# Patient Record
Sex: FEMALE | Race: WHITE | NOT HISPANIC OR LATINO | Employment: FULL TIME | URBAN - METROPOLITAN AREA
[De-identification: names, ages, dates, MRNs, and addresses within clinical notes are randomized per-mention and may not be internally consistent; named-entity substitution may affect disease eponyms.]

---

## 2022-07-25 ENCOUNTER — ANCILLARY PROCEDURE (OUTPATIENT)
Dept: GENERAL RADIOLOGY | Facility: CLINIC | Age: 32
End: 2022-07-25
Attending: OBSTETRICS & GYNECOLOGY
Payer: COMMERCIAL

## 2022-07-25 DIAGNOSIS — N97.9 INFERTILITY, FEMALE: ICD-10-CM

## 2022-07-25 PROCEDURE — 74740 X-RAY FEMALE GENITAL TRACT: CPT

## 2022-07-25 PROCEDURE — 58340 CATHETER FOR HYSTEROGRAPHY: CPT

## 2022-12-06 ENCOUNTER — LAB REQUISITION (OUTPATIENT)
Dept: LAB | Facility: CLINIC | Age: 32
End: 2022-12-06

## 2022-12-06 DIAGNOSIS — Z32.01 ENCOUNTER FOR PREGNANCY TEST, RESULT POSITIVE: ICD-10-CM

## 2022-12-06 PROCEDURE — 84702 CHORIONIC GONADOTROPIN TEST: CPT | Performed by: NURSE PRACTITIONER

## 2022-12-07 LAB — HCG INTACT+B SERPL-ACNC: 380 MIU/ML

## 2022-12-08 ENCOUNTER — LAB REQUISITION (OUTPATIENT)
Dept: LAB | Facility: CLINIC | Age: 32
End: 2022-12-08

## 2022-12-08 DIAGNOSIS — Z32.01 ENCOUNTER FOR PREGNANCY TEST, RESULT POSITIVE: ICD-10-CM

## 2022-12-08 PROCEDURE — 84702 CHORIONIC GONADOTROPIN TEST: CPT | Performed by: NURSE PRACTITIONER

## 2022-12-09 LAB — HCG INTACT+B SERPL-ACNC: 1036 MIU/ML

## 2022-12-15 ENCOUNTER — LAB REQUISITION (OUTPATIENT)
Dept: LAB | Facility: CLINIC | Age: 32
End: 2022-12-15

## 2022-12-15 DIAGNOSIS — O36.80X9 PREGNANCY WITH INCONCLUSIVE FETAL VIABILITY, OTHER FETUS: ICD-10-CM

## 2022-12-15 PROCEDURE — 86901 BLOOD TYPING SEROLOGIC RH(D): CPT | Performed by: NURSE PRACTITIONER

## 2022-12-16 LAB
ABO/RH(D): NORMAL
SPECIMEN EXPIRATION DATE: NORMAL

## 2023-01-16 ENCOUNTER — LAB REQUISITION (OUTPATIENT)
Dept: LAB | Facility: CLINIC | Age: 33
End: 2023-01-16
Payer: COMMERCIAL

## 2023-01-16 ENCOUNTER — TRANSFERRED RECORDS (OUTPATIENT)
Dept: HEALTH INFORMATION MANAGEMENT | Facility: CLINIC | Age: 33
End: 2023-01-16

## 2023-01-16 ENCOUNTER — LAB REQUISITION (OUTPATIENT)
Dept: LAB | Facility: CLINIC | Age: 33
End: 2023-01-16

## 2023-01-16 DIAGNOSIS — Z12.4 ENCOUNTER FOR SCREENING FOR MALIGNANT NEOPLASM OF CERVIX: ICD-10-CM

## 2023-01-16 DIAGNOSIS — Z36.9 ENCOUNTER FOR ANTENATAL SCREENING, UNSPECIFIED: ICD-10-CM

## 2023-01-16 LAB
ABO/RH(D): NORMAL
ANTIBODY SCREEN: NEGATIVE
BASOPHILS # BLD AUTO: 0 10E3/UL (ref 0–0.2)
BASOPHILS NFR BLD AUTO: 0 %
EOSINOPHIL # BLD AUTO: 0.2 10E3/UL (ref 0–0.7)
EOSINOPHIL NFR BLD AUTO: 1 %
ERYTHROCYTE [DISTWIDTH] IN BLOOD BY AUTOMATED COUNT: 14 % (ref 10–15)
HCT VFR BLD AUTO: 47.2 % (ref 35–47)
HGB BLD-MCNC: 15.5 G/DL (ref 11.7–15.7)
HOLD SPECIMEN: NORMAL
IMM GRANULOCYTES # BLD: 0.1 10E3/UL
IMM GRANULOCYTES NFR BLD: 0 %
LYMPHOCYTES # BLD AUTO: 2.7 10E3/UL (ref 0.8–5.3)
LYMPHOCYTES NFR BLD AUTO: 23 %
MCH RBC QN AUTO: 29.9 PG (ref 26.5–33)
MCHC RBC AUTO-ENTMCNC: 32.8 G/DL (ref 31.5–36.5)
MCV RBC AUTO: 91 FL (ref 78–100)
MONOCYTES # BLD AUTO: 0.7 10E3/UL (ref 0–1.3)
MONOCYTES NFR BLD AUTO: 5 %
NEUTROPHILS # BLD AUTO: 8.5 10E3/UL (ref 1.6–8.3)
NEUTROPHILS NFR BLD AUTO: 71 %
NRBC # BLD AUTO: 0 10E3/UL
NRBC BLD AUTO-RTO: 0 /100
PLATELET # BLD AUTO: 241 10E3/UL (ref 150–450)
RBC # BLD AUTO: 5.19 10E6/UL (ref 3.8–5.2)
SPECIMEN EXPIRATION DATE: NORMAL
WBC # BLD AUTO: 12.1 10E3/UL (ref 4–11)

## 2023-01-16 PROCEDURE — G0145 SCR C/V CYTO,THINLAYER,RESCR: HCPCS | Mod: ORL | Performed by: NURSE PRACTITIONER

## 2023-01-16 PROCEDURE — 86592 SYPHILIS TEST NON-TREP QUAL: CPT | Performed by: NURSE PRACTITIONER

## 2023-01-16 PROCEDURE — 86901 BLOOD TYPING SEROLOGIC RH(D): CPT | Performed by: NURSE PRACTITIONER

## 2023-01-16 PROCEDURE — 87491 CHLMYD TRACH DNA AMP PROBE: CPT | Mod: ORL | Performed by: NURSE PRACTITIONER

## 2023-01-16 PROCEDURE — 85025 COMPLETE CBC W/AUTO DIFF WBC: CPT | Mod: ORL | Performed by: NURSE PRACTITIONER

## 2023-01-16 PROCEDURE — 87389 HIV-1 AG W/HIV-1&-2 AB AG IA: CPT | Performed by: NURSE PRACTITIONER

## 2023-01-16 PROCEDURE — 87340 HEPATITIS B SURFACE AG IA: CPT | Performed by: NURSE PRACTITIONER

## 2023-01-16 PROCEDURE — 87624 HPV HI-RISK TYP POOLED RSLT: CPT | Mod: ORL | Performed by: NURSE PRACTITIONER

## 2023-01-16 PROCEDURE — 86762 RUBELLA ANTIBODY: CPT | Performed by: NURSE PRACTITIONER

## 2023-01-16 PROCEDURE — 86803 HEPATITIS C AB TEST: CPT | Performed by: NURSE PRACTITIONER

## 2023-01-16 PROCEDURE — 87086 URINE CULTURE/COLONY COUNT: CPT | Mod: ORL | Performed by: NURSE PRACTITIONER

## 2023-01-17 LAB
C TRACH DNA SPEC QL PROBE+SIG AMP: NEGATIVE
HBV SURFACE AG SERPL QL IA: NONREACTIVE
HCV AB SERPL QL IA: NONREACTIVE
HIV 1+2 AB+HIV1 P24 AG SERPL QL IA: NONREACTIVE
N GONORRHOEA DNA SPEC QL NAA+PROBE: NEGATIVE
RPR SER QL: NONREACTIVE
RUBV IGG SERPL QL IA: 2.11 INDEX
RUBV IGG SERPL QL IA: POSITIVE

## 2023-01-18 LAB — BACTERIA UR CULT: NORMAL

## 2023-01-19 LAB
BKR LAB AP GYN ADEQUACY: NORMAL
BKR LAB AP GYN INTERPRETATION: NORMAL
BKR LAB AP HPV REFLEX: NORMAL
BKR LAB AP LMP: NORMAL
BKR LAB AP PREVIOUS ABNL DX: NORMAL
BKR LAB AP PREVIOUS ABNORMAL: NORMAL
PATH REPORT.COMMENTS IMP SPEC: NORMAL
PATH REPORT.COMMENTS IMP SPEC: NORMAL
PATH REPORT.RELEVANT HX SPEC: NORMAL

## 2023-01-23 LAB
HUMAN PAPILLOMA VIRUS 16 DNA: NEGATIVE
HUMAN PAPILLOMA VIRUS 18 DNA: NEGATIVE
HUMAN PAPILLOMA VIRUS FINAL DIAGNOSIS: NORMAL
HUMAN PAPILLOMA VIRUS OTHER HR: NEGATIVE

## 2023-02-17 ENCOUNTER — TRANSCRIBE ORDERS (OUTPATIENT)
Dept: MATERNAL FETAL MEDICINE | Facility: CLINIC | Age: 33
End: 2023-02-17
Payer: COMMERCIAL

## 2023-02-17 ENCOUNTER — MEDICAL CORRESPONDENCE (OUTPATIENT)
Dept: HEALTH INFORMATION MANAGEMENT | Facility: CLINIC | Age: 33
End: 2023-02-17
Payer: COMMERCIAL

## 2023-02-17 DIAGNOSIS — O26.90 PREGNANCY RELATED CONDITION, ANTEPARTUM: Primary | ICD-10-CM

## 2023-02-20 ENCOUNTER — LAB REQUISITION (OUTPATIENT)
Dept: LAB | Facility: CLINIC | Age: 33
End: 2023-02-20
Payer: COMMERCIAL

## 2023-02-20 DIAGNOSIS — Z3A.16 16 WEEKS GESTATION OF PREGNANCY: ICD-10-CM

## 2023-02-20 PROCEDURE — 82105 ALPHA-FETOPROTEIN SERUM: CPT | Mod: ORL | Performed by: OBSTETRICS & GYNECOLOGY

## 2023-02-22 LAB
# FETUSES US: NORMAL
AFP MOM SERPL: 0.93
AFP SERPL-MCNC: 24 NG/ML
AGE - REPORTED: 33.9 YR
CURRENT SMOKER: NO
FAMILY MEMBER DISEASES HX: NO
GA METHOD: NORMAL
GA: NORMAL WK
IDDM PATIENT QL: NO
INTEGRATED SCN PATIENT-IMP: NORMAL
SPECIMEN DRAWN SERPL: NORMAL

## 2023-02-28 ENCOUNTER — OFFICE VISIT (OUTPATIENT)
Dept: MATERNAL FETAL MEDICINE | Facility: CLINIC | Age: 33
End: 2023-02-28
Attending: NURSE PRACTITIONER
Payer: COMMERCIAL

## 2023-02-28 DIAGNOSIS — Z31.430 ENCOUNTER OF FEMALE FOR TESTING FOR GENETIC DISEASE CARRIER STATUS FOR PROCREATIVE MANAGEMENT: Primary | ICD-10-CM

## 2023-02-28 DIAGNOSIS — O26.90 PREGNANCY RELATED CONDITION, ANTEPARTUM: ICD-10-CM

## 2023-02-28 NOTE — PROGRESS NOTES
"[facility]  Genetic Counseling Consult    Patient:  Janina Mcarthur YOB: 1990   Date of Service:  23   MRN: [id number]    Janina was seen at the [facility] for genetic consultation. The indication for genetic counseling is positive carrier screening for cystic fibrosis. The patient was accompanied to this visit by their partner, Domo. The patient is wearing a mask due to current [facility] policies.     The session was conducted in English.      IMPRESSION/ PLAN   1. Janina has already undergone screening with her primary OB provider. She had a low-risk NIPS and had carrier screening, which was positive for cystic fibrosis. As part of today's consultation, Janina's partner, Domo underwent genetic testing for CFTR mutations through [facility]. Results are expected back in 2-3 weeks and will be called to Domo.     PREGNANCY HISTORY   /Parity: No obstetric history on file.      Janina's pregnancy history is significant for:     This is Janina's first pregnancy.     CURRENT PREGNANCY   Current Age: 32 year old   Age at Delivery: 32 year old  JOSE ALEJANDRO: Not found.                                   Gestational Age: Unknown  This pregnancy is a single gestation.   This pregnancy was conceived spontaneously.    MEDICAL HISTORY   Janina s reported medical history is not expected to impact pregnancy management or risks to fetal development.       FAMILY HISTORY   A three-generation pedigree was obtained today and is scanned under the \"Media\" tab in Epic. The family history was reported by Janina and their partner.    The following significant findings were reported today:     Janina has two brothers who are alive and well.      Janina's partner, Domo, is 34 and healthy.     Domo has one sister who is alive and well.     Otherwise, the reported family history is unremarkable for multiple miscarriages, stillbirths, birth defects, intellectual " disabilities, known genetic conditions, and consanguinity.       CARRIER SCREENING   Janina underwent carrier screening through her OB provider for spinal muscular atrophy (SMA) and cystic fibrosis (97 common mutations). She screened negative for SMA, but was identifed to be a carrier of cystic fibrosis.     Cystic fibrosis (CF) is a genetic condition caused by mutations in the CFTR gene. The condition is characterized by thick mucus in the lungs and digestive system. CF is a chronic condition and affected individuals typically experience breathing problems and lung infections that worsen over time. Affected individuals often also have pancreatic insufficiency. In XY individuals infertility and delayed puberty is common. Severity of symptoms vary for individuals with most cases being diagnosed during childhood or through [place] Screening programs. CF is treated based on symptoms and aims to help with respiratory functions as well as enzymes used to help pancreatic function to proper digestion. With the Catholic of CFTR modulators, individuals with cystic fibrosis are living longer than ever. Importantly, these modulators typically targeted a specific variant called [id number]. This is the variant that Janina carries. This condition has a carrier frequency of 1 in 25 in the  population. Thus, the current reproductive risk is 1 in 100 (1%). In the event that Domo screens negative, there will still be a 1 in 2 chance for this or any future pregnancy to be a carrier of the condition, but the reproductive risk will be significantly reduced. We reviewed that there is evidence that carriers of CF are at an increased risk for pancreatitis, though the empiric risk is <1% and there are no specific management guidelines at this time.     Domo has opted to proceed with testing for cystic fibrosis today. We discussed that if he is positive, this pregnancy and any future pregnancy will have a 1 in 4 (25%) chance to be affected.  We reviewed options for diagnostic testing and discussed [place] Screening. We also discussed that Janina's siblings are at a 50% chance to be carriers of CF.     I also reviewed with the couple that there are options for additional carrier screening, and that current guidelines no longer recommend screening for CF and SMA alone. Additionally, current guidelines do not recommend genotyping for CFTR and instead are in line with sequencing and deletion/duplication analysis.     The couple declined further carrier screening, but know the option remains available to them in the future if desired.        RISK ASSESSMENT FOR CHROMOSOME CONDITIONS   We explained that the risk for fetal chromosome abnormalities increases with maternal age. We discussed specific features of common chromosome abnormalities, including Down syndrome, trisomy 13, trisomy 18, and sex chromosome trisomies.      At age 32 at midtrimester, the risk to have a baby with Down syndrome is 1 in 421.     At age 32 at midtrimester, the risk to have a baby with any chromosome abnormality is 1 in 217.    Janina had genetic screening earlier in this pregnancy. Their non-invasive prenatal test was screen negative or low risk for screened conditions     Non-invasive prenatal testing (NIPT) results    Maternal plasma cell-free DNA testing    Screens for fetal trisomy 21, trisomy 13, trisomy 18, and sex chromosome aneuploidy    First trimester ultrasound with nuchal translucency and nasal bone assessment was not performed in this pregnancy, to our knowledge.    Janina had a [id number] test earlier in pregnancy; we reviewed the results today, which are low risk.    The NIPT did include sex chromosome aneuploidies and the result was low risk. The predicted sex is XX, which is typically female.    Given the accuracy of this test, these results greatly decrease the chance for certain fetal chromosome abnormalities    We discussed the limitations of normal NIPT  results    Maternal serum AFP only to screen for open neural tube defects (after 15 weeks) was not performed in this pregnancy, to our knowledge.     GENETIC TESTING OPTIONS   Genetic testing during a pregnancy includes screening and diagnostic procedures.      Screening tests are non-invasive which means no risk to the pregnancy and includes ultrasounds and blood work. The benefits and limitations of screening were reviewed. Screening tests provide a risk assessment (chance) specific to the pregnancy for certain fetal chromosome abnormalities but cannot definitively diagnose or exclude a fetal chromosome abnormality. Follow-up genetic counseling and consideration of diagnostic testing is recommended with any abnormal screening result. Diagnostic testing during a pregnancy is more certain and can test for more conditions. However, the tests do have a risk of miscarriage that requires careful consideration. These tests can detect fetal chromosome abnormalities with greater than 99% certainty. Results can be compromised by maternal cell contamination or mosaicism and are limited by the resolution of current genetic testing technology.     There is no screening or diagnostic test that detects all forms of birth defects or intellectual disability.       We discussed the following diagnostic options:   Amniocentesis    Invasive diagnostic procedure done after 15 weeks gestation    The procedure collects a small sample of amniotic fluid for the purpose of chromosomal testing and/or other genetic testing    Diagnostic result; more than 99% sensitivity for fetal chromosome abnormalities    Testing for AFP in the amniotic fluid can test for open neural tube defects        It was a pleasure to be involved with [facility] [place]. Face-to-face time of the meeting was 30 minutes.    Jana King GC, MS, [facility]  Certified and [place] Licensed Genetic Counselor  [facility]  Maternal Fetal Medicine  Office: [phone]  M:  [phone]   Fax: [phone]  [facility]

## 2023-03-09 ENCOUNTER — TELEPHONE (OUTPATIENT)
Dept: MATERNAL FETAL MEDICINE | Facility: CLINIC | Age: 33
End: 2023-03-09
Payer: COMMERCIAL

## 2023-03-09 NOTE — TELEPHONE ENCOUNTER
March 9, 2023    I left a voicemail for Janina regarding her partner's carrier screening results for CFTR only.     Results were NEGATIVE, he was not identified to be a carrier of the condition. This reduces the couple's reproductive risk for classic cystic fibrosis to 1 in 17,600. I reminded Janina that any of her children or siblings have a 50% chance to be carriers of cystic fibrosis. Other family members may also be carriers.     Jana King MS, [facility]  Licensed Genetic Counselor  [facility]  Maternal Fetal Medicine  [email or web address]  [phone]

## 2023-05-19 ENCOUNTER — LAB REQUISITION (OUTPATIENT)
Dept: LAB | Facility: CLINIC | Age: 33
End: 2023-05-19
Payer: COMMERCIAL

## 2023-05-19 DIAGNOSIS — Z36.89 ENCOUNTER FOR OTHER SPECIFIED ANTENATAL SCREENING: ICD-10-CM

## 2023-05-19 LAB — HGB BLD-MCNC: 13.6 G/DL (ref 11.7–15.7)

## 2023-05-19 PROCEDURE — 85018 HEMOGLOBIN: CPT | Mod: ORL | Performed by: NURSE PRACTITIONER

## 2023-05-19 PROCEDURE — 86592 SYPHILIS TEST NON-TREP QUAL: CPT | Mod: ORL | Performed by: NURSE PRACTITIONER

## 2023-05-20 ENCOUNTER — HEALTH MAINTENANCE LETTER (OUTPATIENT)
Age: 33
End: 2023-05-20

## 2023-05-22 LAB — RPR SER QL: NONREACTIVE

## 2023-07-17 ENCOUNTER — LAB REQUISITION (OUTPATIENT)
Dept: LAB | Facility: CLINIC | Age: 33
End: 2023-07-17
Payer: COMMERCIAL

## 2023-07-17 DIAGNOSIS — Z3A.36 36 WEEKS GESTATION OF PREGNANCY: ICD-10-CM

## 2023-07-17 PROCEDURE — 87653 STREP B DNA AMP PROBE: CPT | Mod: ORL | Performed by: OBSTETRICS & GYNECOLOGY

## 2023-07-18 LAB — GP B STREP DNA SPEC QL NAA+PROBE: NEGATIVE

## 2023-08-15 ENCOUNTER — TRANSFERRED RECORDS (OUTPATIENT)
Dept: OBGYN | Facility: CLINIC | Age: 33
End: 2023-08-15
Payer: COMMERCIAL

## 2023-08-15 ENCOUNTER — HOSPITAL ENCOUNTER (INPATIENT)
Facility: CLINIC | Age: 33
LOS: 3 days | Discharge: HOME | End: 2023-08-18
Attending: OBSTETRICS & GYNECOLOGY | Admitting: OBSTETRICS & GYNECOLOGY
Payer: COMMERCIAL

## 2023-08-15 PROBLEM — Z36.89 ENCOUNTER FOR TRIAGE IN PREGNANT PATIENT: Status: ACTIVE | Noted: 2023-08-15

## 2023-08-15 LAB
ABO/RH(D): NORMAL
ANTIBODY SCREEN: NEGATIVE
HGB BLD-MCNC: 14.3 G/DL (ref 11.7–15.7)
SPECIMEN EXPIRATION DATE: NORMAL

## 2023-08-15 PROCEDURE — 86780 TREPONEMA PALLIDUM: CPT | Performed by: OBSTETRICS & GYNECOLOGY

## 2023-08-15 PROCEDURE — 85018 HEMOGLOBIN: CPT | Performed by: OBSTETRICS & GYNECOLOGY

## 2023-08-15 PROCEDURE — 120N000001 HC R&B MED SURG/OB

## 2023-08-15 PROCEDURE — G0463 HOSPITAL OUTPT CLINIC VISIT: HCPCS

## 2023-08-15 RX ORDER — METOCLOPRAMIDE 10 MG/1
10 TABLET ORAL EVERY 6 HOURS PRN
Status: DISCONTINUED | OUTPATIENT
Start: 2023-08-15 | End: 2023-08-18 | Stop reason: HOSPADM

## 2023-08-15 RX ORDER — PROCHLORPERAZINE MALEATE 10 MG
10 TABLET ORAL EVERY 6 HOURS PRN
Status: DISCONTINUED | OUTPATIENT
Start: 2023-08-15 | End: 2023-08-18 | Stop reason: HOSPADM

## 2023-08-15 RX ORDER — KETOROLAC TROMETHAMINE 30 MG/ML
30 INJECTION, SOLUTION INTRAMUSCULAR; INTRAVENOUS
Status: DISCONTINUED | OUTPATIENT
Start: 2023-08-15 | End: 2023-08-18 | Stop reason: HOSPADM

## 2023-08-15 RX ORDER — NALOXONE HYDROCHLORIDE 0.4 MG/ML
0.2 INJECTION, SOLUTION INTRAMUSCULAR; INTRAVENOUS; SUBCUTANEOUS
Status: DISCONTINUED | OUTPATIENT
Start: 2023-08-15 | End: 2023-08-18 | Stop reason: HOSPADM

## 2023-08-15 RX ORDER — OXYTOCIN/0.9 % SODIUM CHLORIDE 30/500 ML
340 PLASTIC BAG, INJECTION (ML) INTRAVENOUS CONTINUOUS PRN
Status: DISCONTINUED | OUTPATIENT
Start: 2023-08-15 | End: 2023-08-18 | Stop reason: HOSPADM

## 2023-08-15 RX ORDER — METOCLOPRAMIDE HYDROCHLORIDE 5 MG/ML
10 INJECTION INTRAMUSCULAR; INTRAVENOUS EVERY 6 HOURS PRN
Status: DISCONTINUED | OUTPATIENT
Start: 2023-08-15 | End: 2023-08-18 | Stop reason: HOSPADM

## 2023-08-15 RX ORDER — CITRIC ACID/SODIUM CITRATE 334-500MG
30 SOLUTION, ORAL ORAL
Status: DISCONTINUED | OUTPATIENT
Start: 2023-08-15 | End: 2023-08-18 | Stop reason: HOSPADM

## 2023-08-15 RX ORDER — NALOXONE HYDROCHLORIDE 0.4 MG/ML
0.4 INJECTION, SOLUTION INTRAMUSCULAR; INTRAVENOUS; SUBCUTANEOUS
Status: DISCONTINUED | OUTPATIENT
Start: 2023-08-15 | End: 2023-08-18 | Stop reason: HOSPADM

## 2023-08-15 RX ORDER — OXYTOCIN 10 [USP'U]/ML
10 INJECTION, SOLUTION INTRAMUSCULAR; INTRAVENOUS
Status: DISCONTINUED | OUTPATIENT
Start: 2023-08-15 | End: 2023-08-18 | Stop reason: HOSPADM

## 2023-08-15 RX ORDER — MISOPROSTOL 200 UG/1
800 TABLET ORAL
Status: DISCONTINUED | OUTPATIENT
Start: 2023-08-15 | End: 2023-08-17

## 2023-08-15 RX ORDER — METHYLERGONOVINE MALEATE 0.2 MG/ML
200 INJECTION INTRAVENOUS
Status: DISCONTINUED | OUTPATIENT
Start: 2023-08-15 | End: 2023-08-17

## 2023-08-15 RX ORDER — ONDANSETRON 2 MG/ML
4 INJECTION INTRAMUSCULAR; INTRAVENOUS EVERY 6 HOURS PRN
Status: DISCONTINUED | OUTPATIENT
Start: 2023-08-15 | End: 2023-08-18 | Stop reason: HOSPADM

## 2023-08-15 RX ORDER — SODIUM CHLORIDE, SODIUM LACTATE, POTASSIUM CHLORIDE, CALCIUM CHLORIDE 600; 310; 30; 20 MG/100ML; MG/100ML; MG/100ML; MG/100ML
INJECTION, SOLUTION INTRAVENOUS CONTINUOUS
Status: DISCONTINUED | OUTPATIENT
Start: 2023-08-15 | End: 2023-08-18 | Stop reason: HOSPADM

## 2023-08-15 RX ORDER — TRANEXAMIC ACID 10 MG/ML
1 INJECTION, SOLUTION INTRAVENOUS EVERY 30 MIN PRN
Status: DISCONTINUED | OUTPATIENT
Start: 2023-08-15 | End: 2023-08-17

## 2023-08-15 RX ORDER — CARBOPROST TROMETHAMINE 250 UG/ML
250 INJECTION, SOLUTION INTRAMUSCULAR
Status: DISCONTINUED | OUTPATIENT
Start: 2023-08-15 | End: 2023-08-17

## 2023-08-15 RX ORDER — OXYTOCIN/0.9 % SODIUM CHLORIDE 30/500 ML
100-340 PLASTIC BAG, INJECTION (ML) INTRAVENOUS CONTINUOUS PRN
Status: DISCONTINUED | OUTPATIENT
Start: 2023-08-15 | End: 2023-08-18 | Stop reason: HOSPADM

## 2023-08-15 RX ORDER — MISOPROSTOL 200 UG/1
400 TABLET ORAL
Status: DISCONTINUED | OUTPATIENT
Start: 2023-08-15 | End: 2023-08-17

## 2023-08-15 RX ORDER — PROCHLORPERAZINE 25 MG
25 SUPPOSITORY, RECTAL RECTAL EVERY 12 HOURS PRN
Status: DISCONTINUED | OUTPATIENT
Start: 2023-08-15 | End: 2023-08-18 | Stop reason: HOSPADM

## 2023-08-15 RX ORDER — ONDANSETRON 4 MG/1
4 TABLET, ORALLY DISINTEGRATING ORAL EVERY 6 HOURS PRN
Status: DISCONTINUED | OUTPATIENT
Start: 2023-08-15 | End: 2023-08-18 | Stop reason: HOSPADM

## 2023-08-15 RX ORDER — FENTANYL CITRATE 50 UG/ML
100 INJECTION, SOLUTION INTRAMUSCULAR; INTRAVENOUS
Status: DISCONTINUED | OUTPATIENT
Start: 2023-08-15 | End: 2023-08-18 | Stop reason: HOSPADM

## 2023-08-15 RX ORDER — ASPIRIN 81 MG/1
81 TABLET ORAL DAILY
Status: ON HOLD | COMMUNITY
End: 2023-08-18

## 2023-08-15 RX ORDER — IBUPROFEN 800 MG/1
800 TABLET, FILM COATED ORAL
Status: DISCONTINUED | OUTPATIENT
Start: 2023-08-15 | End: 2023-08-18 | Stop reason: HOSPADM

## 2023-08-15 ASSESSMENT — ACTIVITIES OF DAILY LIVING (ADL)
WALKING_OR_CLIMBING_STAIRS_DIFFICULTY: NO
DRESSING/BATHING_DIFFICULTY: NO
TOILETING_ISSUES: NO
WEAR_GLASSES_OR_BLIND: NO
CONCENTRATING,_REMEMBERING_OR_MAKING_DECISIONS_DIFFICULTY: NO
DIFFICULTY_EATING/SWALLOWING: NO
CHANGE_IN_FUNCTIONAL_STATUS_SINCE_ONSET_OF_CURRENT_ILLNESS/INJURY: NO
ADLS_ACUITY_SCORE: 35
DOING_ERRANDS_INDEPENDENTLY_DIFFICULTY: NO
FALL_HISTORY_WITHIN_LAST_SIX_MONTHS: NO

## 2023-08-16 ENCOUNTER — ANESTHESIA EVENT (OUTPATIENT)
Dept: OBGYN | Facility: CLINIC | Age: 33
End: 2023-08-16
Payer: COMMERCIAL

## 2023-08-16 ENCOUNTER — ANESTHESIA (OUTPATIENT)
Dept: OBGYN | Facility: CLINIC | Age: 33
End: 2023-08-16
Payer: COMMERCIAL

## 2023-08-16 LAB — T PALLIDUM AB SER QL: NONREACTIVE

## 2023-08-16 PROCEDURE — 36415 COLL VENOUS BLD VENIPUNCTURE: CPT | Performed by: OBSTETRICS & GYNECOLOGY

## 2023-08-16 PROCEDURE — 120N000001 HC R&B MED SURG/OB

## 2023-08-16 PROCEDURE — 250N000009 HC RX 250: Performed by: OBSTETRICS & GYNECOLOGY

## 2023-08-16 PROCEDURE — 250N000013 HC RX MED GY IP 250 OP 250 PS 637: Performed by: OBSTETRICS & GYNECOLOGY

## 2023-08-16 PROCEDURE — 86850 RBC ANTIBODY SCREEN: CPT | Performed by: OBSTETRICS & GYNECOLOGY

## 2023-08-16 PROCEDURE — 258N000003 HC RX IP 258 OP 636: Performed by: OBSTETRICS & GYNECOLOGY

## 2023-08-16 PROCEDURE — 250N000011 HC RX IP 250 OP 636: Performed by: ANESTHESIOLOGY

## 2023-08-16 PROCEDURE — 370N000003 HC ANESTHESIA WARD SERVICE: Performed by: ANESTHESIOLOGY

## 2023-08-16 PROCEDURE — 250N000011 HC RX IP 250 OP 636: Performed by: OBSTETRICS & GYNECOLOGY

## 2023-08-16 PROCEDURE — 00HU33Z INSERTION OF INFUSION DEVICE INTO SPINAL CANAL, PERCUTANEOUS APPROACH: ICD-10-PCS | Performed by: ANESTHESIOLOGY

## 2023-08-16 PROCEDURE — 250N000009 HC RX 250: Performed by: ANESTHESIOLOGY

## 2023-08-16 PROCEDURE — 258N000003 HC RX IP 258 OP 636: Performed by: ANESTHESIOLOGY

## 2023-08-16 PROCEDURE — 250N000011 HC RX IP 250 OP 636: Mod: JZ | Performed by: ANESTHESIOLOGY

## 2023-08-16 PROCEDURE — 3E0R3BZ INTRODUCTION OF ANESTHETIC AGENT INTO SPINAL CANAL, PERCUTANEOUS APPROACH: ICD-10-PCS | Performed by: ANESTHESIOLOGY

## 2023-08-16 RX ORDER — SODIUM CHLORIDE, SODIUM LACTATE, POTASSIUM CHLORIDE, CALCIUM CHLORIDE 600; 310; 30; 20 MG/100ML; MG/100ML; MG/100ML; MG/100ML
INJECTION, SOLUTION INTRAVENOUS CONTINUOUS PRN
Status: DISCONTINUED | OUTPATIENT
Start: 2023-08-16 | End: 2023-08-18 | Stop reason: HOSPADM

## 2023-08-16 RX ORDER — ONDANSETRON 2 MG/ML
4 INJECTION INTRAMUSCULAR; INTRAVENOUS EVERY 6 HOURS PRN
Status: DISCONTINUED | OUTPATIENT
Start: 2023-08-16 | End: 2023-08-18 | Stop reason: HOSPADM

## 2023-08-16 RX ORDER — CALCIUM CARBONATE 500 MG/1
500 TABLET, CHEWABLE ORAL DAILY PRN
Status: DISCONTINUED | OUTPATIENT
Start: 2023-08-16 | End: 2023-08-18 | Stop reason: HOSPADM

## 2023-08-16 RX ORDER — BUPIVACAINE HYDROCHLORIDE 2.5 MG/ML
INJECTION, SOLUTION EPIDURAL; INFILTRATION; INTRACAUDAL
Status: COMPLETED | OUTPATIENT
Start: 2023-08-16 | End: 2023-08-16

## 2023-08-16 RX ORDER — LIDOCAINE 40 MG/G
CREAM TOPICAL
Status: DISCONTINUED | OUTPATIENT
Start: 2023-08-16 | End: 2023-08-18 | Stop reason: HOSPADM

## 2023-08-16 RX ORDER — FENTANYL CITRATE-0.9 % NACL/PF 10 MCG/ML
100 PLASTIC BAG, INJECTION (ML) INTRAVENOUS EVERY 5 MIN PRN
Status: DISCONTINUED | OUTPATIENT
Start: 2023-08-16 | End: 2023-08-18 | Stop reason: HOSPADM

## 2023-08-16 RX ORDER — OXYTOCIN/0.9 % SODIUM CHLORIDE 30/500 ML
1-24 PLASTIC BAG, INJECTION (ML) INTRAVENOUS CONTINUOUS
Status: DISCONTINUED | OUTPATIENT
Start: 2023-08-16 | End: 2023-08-18 | Stop reason: HOSPADM

## 2023-08-16 RX ORDER — LIDOCAINE HCL/EPINEPHRINE/PF 2%-1:200K
VIAL (ML) INJECTION
Status: COMPLETED | OUTPATIENT
Start: 2023-08-16 | End: 2023-08-16

## 2023-08-16 RX ORDER — ONDANSETRON 4 MG/1
4 TABLET, ORALLY DISINTEGRATING ORAL EVERY 6 HOURS PRN
Status: DISCONTINUED | OUTPATIENT
Start: 2023-08-16 | End: 2023-08-18 | Stop reason: HOSPADM

## 2023-08-16 RX ORDER — NALBUPHINE HYDROCHLORIDE 20 MG/ML
2.5-5 INJECTION, SOLUTION INTRAMUSCULAR; INTRAVENOUS; SUBCUTANEOUS EVERY 6 HOURS PRN
Status: DISCONTINUED | OUTPATIENT
Start: 2023-08-16 | End: 2023-08-18 | Stop reason: HOSPADM

## 2023-08-16 RX ADMIN — BUPIVACAINE HYDROCHLORIDE 10 ML: 2.5 INJECTION, SOLUTION EPIDURAL; INFILTRATION; INTRACAUDAL at 03:20

## 2023-08-16 RX ADMIN — Medication 2 MILLI-UNITS/MIN: at 08:37

## 2023-08-16 RX ADMIN — Medication: at 20:56

## 2023-08-16 RX ADMIN — Medication: at 11:06

## 2023-08-16 RX ADMIN — SODIUM CHLORIDE, POTASSIUM CHLORIDE, SODIUM LACTATE AND CALCIUM CHLORIDE: 600; 310; 30; 20 INJECTION, SOLUTION INTRAVENOUS at 12:54

## 2023-08-16 RX ADMIN — SODIUM CHLORIDE, POTASSIUM CHLORIDE, SODIUM LACTATE AND CALCIUM CHLORIDE 250 ML: 600; 310; 30; 20 INJECTION, SOLUTION INTRAVENOUS at 09:34

## 2023-08-16 RX ADMIN — LIDOCAINE HYDROCHLORIDE,EPINEPHRINE BITARTRATE 2.5 ML: 20; .005 INJECTION, SOLUTION EPIDURAL; INFILTRATION; INTRACAUDAL; PERINEURAL at 03:18

## 2023-08-16 RX ADMIN — FENTANYL CITRATE 100 MCG: 50 INJECTION, SOLUTION INTRAMUSCULAR; INTRAVENOUS at 02:06

## 2023-08-16 RX ADMIN — FENTANYL CITRATE 100 MCG: 50 INJECTION, SOLUTION INTRAMUSCULAR; INTRAVENOUS at 00:57

## 2023-08-16 RX ADMIN — CALCIUM CARBONATE (ANTACID) CHEW TAB 500 MG 500 MG: 500 CHEW TAB at 17:40

## 2023-08-16 RX ADMIN — SODIUM CHLORIDE, POTASSIUM CHLORIDE, SODIUM LACTATE AND CALCIUM CHLORIDE 500 ML: 600; 310; 30; 20 INJECTION, SOLUTION INTRAVENOUS at 03:04

## 2023-08-16 RX ADMIN — SODIUM CHLORIDE, POTASSIUM CHLORIDE, SODIUM LACTATE AND CALCIUM CHLORIDE: 600; 310; 30; 20 INJECTION, SOLUTION INTRAVENOUS at 18:40

## 2023-08-16 RX ADMIN — SODIUM CHLORIDE, POTASSIUM CHLORIDE, SODIUM LACTATE AND CALCIUM CHLORIDE: 600; 310; 30; 20 INJECTION, SOLUTION INTRAVENOUS at 07:19

## 2023-08-16 RX ADMIN — Medication: at 03:37

## 2023-08-16 ASSESSMENT — ACTIVITIES OF DAILY LIVING (ADL)
ADLS_ACUITY_SCORE: 18

## 2023-08-16 NOTE — PROVIDER NOTIFICATION
08/16/23 1513   Provider Notification   Provider Name/Title Dr. Esquivel   Method of Notification Phone   Request Evaluate - Remote     Cervix 7/80-90/-1. Plan per provider: check cervix at 1630 and update provider unless indicated before.

## 2023-08-16 NOTE — PLAN OF CARE
Data: [facility]: 8/15/2023 10:02 PM.  Reason for maternal/fetal assessment is uterine contractions. Patient reports contractions started at 0300, became more consistent at 1830.  Patient is a .  Prenatal record reviewed. Pregnancy has been uncomplicated..  Gestational Age 40w6d. VSS. Fetal movement active. Patient denies leaking of vaginal fluid/rupture of membranes, vaginal bleeding, abdominal pain, pelvic pressure, nausea, vomiting, headache, visual disturbances, epigastric or URQ pain, significant edema. Support person is present.   Action: Verbal consent for EFM. Triage assessment completed. Bill of rights reviewed.  Response: Patient verbalized agreement with plan. Will contact Dr Summer Garcia with update and for further orders.

## 2023-08-16 NOTE — PROVIDER NOTIFICATION
08/16/23 1815   Provider Notification   Provider Name/Title Dr. Candelaria   Method of Notification Phone   Request Evaluate - Remote   Notification Reason SVE;Status Update     1803- SVE 8.5/90/-1. UC's q 1.5-3. Fetal baseline 145, accels and late and variable decelerations noted. Cranberry urine noted in brooks tubing. 430 ml urine output and LR 2000 ml LR intake since 0700. Brooks bulb palpated between cervix and babies head. Dr. Candelaria updated per phone. Plan per provider: Do cath care and deflate brooks bulb and re- inflate, recheck cervix at 2000 and update provider.

## 2023-08-16 NOTE — PLAN OF CARE
1020- Patient repositioned to throne position, nauseated, repositioned to left side. Angle q 2-6 minutes, occasional minimal variability noted. Recurrent variable and late decelerations noted. SVE 6/80/-1. Patient comfortable with epidural. 1045- Dr. Esquivel updated per phone. Viewed fetal and uterine tracing. Plan per provider: OK to continue Pitocin at 10 carlyn units per hour. 1059- Prolonged deceleration down to 70's. Patient repositioned to right side, Pitocin shut off. 1105- recovery to baseline of 140, moderate variability. Dr. Esquivel aware. Plan per provider: leave Pitocin off for now.

## 2023-08-16 NOTE — ANESTHESIA PROCEDURE NOTES
Epidural catheter Procedure Note    Pre-Procedure   Staff -        Anesthesiologist:  Sam Escobar MD       Performed By: anesthesiologist       Referred By: Radha       Location: OB       Pre-Anesthestic Checklist: patient identified, IV checked, risks and benefits discussed, informed consent, monitors and equipment checked, pre-op evaluation, at physician/surgeon's request and post-op pain management  Timeout:       Correct Patient: Yes        Correct Procedure: Yes        Correct Site: Yes        Correct Position: Yes   Procedure Documentation  Procedure: epidural catheter  Medication(s) Administered   0.25% Bupivacaine PF (Epidural) - EPIDURAL   10 mL - 8/16/2023 3:20:00 AM  2% Lidocaine w/ 1:200K Epi (EPIDURAL) - EPIDURAL   2.5 mL - 8/16/2023 3:18:00 AM   Comments:  Patient desires Labor Epidural for labor analgesia. Vaginal delivery anticipated.    Chart reviewed. Patient examined. No changes to pre procedure chart review. Risks including but not limited to bleeding, infection, nerve injury, PDPH, intrathecal injection, high block, incomplete block, one-sided block, back pain, and low blood pressure discussed in detail. Questions answered. Consent signed.    Pause for the Cause completed. NIBP and pulse ox functioning. L&D nurse present.    Procedure: Sitting. Betadine prep x 3. Sterile drape applied.  Lidocaine 1% x 2 cc local infiltration at L 3-4.  17 G. Tu needle ML VIC 1 attempt.  No CSF, paresthesia or blood. 20 g. Epidural catheter inserted w/o resistance 5 cm.  Negative aspiration for CSF and blood. Filter in line.  Test dose Lidocaine 2% w/ 1:200,000 epi x 2.5 cc injected. Negative for neuro change or symptoms of intravascular injection.  Bolus dose: Marcaine 0.25% 5cc x 2 doses (10 cc total).  Infusion orders written.    I or my partner am immediately available. I or my partner will monitor the patient and supervise nursing care at necessary intervals.    Mini      FOR [facility]  "([facility]) ONLY:   Pain Team Contact information: please page the Pain Team Via [facility]. Search \"Pain\". During daytime hours, please page the attending first. At night please page the resident first.      "

## 2023-08-16 NOTE — PROVIDER NOTIFICATION
Dr Garcia notified of SVE, contractions that have spaced to 2-6 minutes apart, and periods of minimal variability with occasional variable decelerations and 1 late deceleration.  Majority of time category 1 tracing.  Plan to have Dr Welch evaluate patient when she arrives at 8am and possibly perform AROM.

## 2023-08-16 NOTE — PROVIDER NOTIFICATION
08/16/23 1243   Provider Notification   Provider Name/Title Dr. Esquivel   Method of Notification Phone   Request Evaluate - Remote     Contractions q 4.5-6 minutes apart. No decelerations noted at this time. Urine in brooks pinkish boris. Has had 300 ml urine out this shift and 1000 ml LR in and little fluid orally. Plan per provider restart Pitocin at 2 carlyn units.

## 2023-08-16 NOTE — PROVIDER NOTIFICATION
Dr Garcia notified of SVE, contractions every 2-5 minutes palpating moderate, patient getting much more uncomfortable, and category 1 tracing with the exception of a period of minimal variability after a dose of IV fentanyl.  Patient is considering epidural shortly but still undecided.  Will plan to continue to observe labor and recheck per nurses discretion.  When patient receives epidural will plan to place brooks catheter.

## 2023-08-16 NOTE — PROVIDER NOTIFICATION
08/16/23 0820   Provider Notification   Provider Name/Title Dr. Esquivel   Method of Notification At Bedside   Request Evaluate in Person   Notification Reason SVE;Membrane Status     SVE- 5-6/80/-1, AROM, clear fluid. Plan per provider: begin Pitocin augmentation. Patient OK with POC.

## 2023-08-16 NOTE — PROVIDER NOTIFICATION
08/16/23 1655   Provider Notification   Provider Name/Title Dr. Esquivel   Method of Notification Phone   Request Evaluate - Remote     SVE 1635- 7.5-8/90/-1. 1650-  to throne position. Pitocin 10 carlyn units infusing, last increased at 1504. Patient comfortable with epidural. Update given, plan per provider: recheck cervix at 1800 and update Dr. Candelaria.

## 2023-08-16 NOTE — H&P
"OB Brief Admit H&P    No significant change in general health status based on examination of the patient, review of Nursing Admission Database and prenatal record.    Pt is a 32 year old  @ 41w0d who presented to L&D with spontaneous labor.    Patient's prenatal course has been complicated by obesity, history of gastric sleeve. She had a normal growth ultrasound in the 3rd trimester.    Prenatal Labs:    Blood type O+  Rubella immune  GCT passed  GBS negative    EFW: 8.5lb    /55   Pulse 77   Temp 98.3  F (36.8  C) (Oral)   Resp 18   Ht 1.651 m (5' 5\")   Wt 103.4 kg (228 lb)   SpO2 96%   BMI 37.94 kg/m    EFM:  120 baseline, moderate variability, +accels, no decels  Lugoff: q3-7 minutes  SVE: 5-6/80/-1  Membranes:  AROM with scant clear fluid    Assessment:  32 year old  @ 41w0d with spontaneous labor.    Plan:  1. Admit to labor and delivery   2. Contractions spacing. Now s/p AROM with scant fluid, will start pitocin per protocol.  3. Fetal well-being: category I FHT, continuous monitoring.   4. Rubella immune, Rh positive, s/p Tdap.  5. Anticipate .      Maria Fernanda Loyola MD  2023  10:23 AM    "

## 2023-08-16 NOTE — ANESTHESIA PREPROCEDURE EVALUATION
Anesthesia Pre-Procedure Evaluation    Patient: Janina Mcarthur   MRN: [id number] : 1990        Procedure :           History reviewed. No pertinent past medical history.   History reviewed. No pertinent surgical history.   Allergies   Allergen Reactions    Minocycline Hives      Social History     Tobacco Use    Smoking status: Never    Smokeless tobacco: Never   Substance Use Topics    Alcohol use: Not on file      Wt Readings from Last 1 Encounters:   08/15/23 103.4 kg (228 lb)        Anesthesia Evaluation            ROS/MED HX  ENT/Pulmonary:       Neurologic:       Cardiovascular:       METS/Exercise Tolerance:     Hematologic:       Musculoskeletal:       GI/Hepatic:     (+) GERD,                   Renal/Genitourinary:       Endo:       Psychiatric/Substance Use:       Infectious Disease:       Malignancy:       Other:            Physical Exam    Airway        Mallampati: II   TM distance: > 3 FB   Neck ROM: full   Mouth opening: > 3 cm    Respiratory Devices and Support         Dental           Cardiovascular   cardiovascular exam normal          Pulmonary   pulmonary exam normal            Other findings: Lab Test        08/15/23     05/19/23     01/16/23                       2317          0936          1623          WBC           --           --          12.1*         HGB          14.3         13.6         15.5          MCV           --           --          91            PLT           --           --          241            No lab results found.      OUTSIDE LABS:  CBC:   Lab Results   Component Value Date    WBC 12.1 (H) 2023    HGB 14.3 08/15/2023    HGB 13.6 2023    HCT 47.2 (H) 2023     2023     BMP: No results found for: NA, POTASSIUM, CHLORIDE, CO2, BUN, CR, GLC  COAGS: No results found for: PTT, INR, FIBR  POC: No results found for: BGM, HCG, HCGS  HEPATIC: No results found for: ALBUMIN, PROTTOTAL, ALT, AST, GGT, ALKPHOS, BILITOTAL, BILIDIRECT, SALEEM  OTHER: No  results found for: PH, LACT, A1C, RAVEN, PHOS, MAG, LIPASE, AMYLASE, TSH, T4, T3, CRP, SED    Anesthesia Plan    ASA Status:  2       Anesthesia Type: Epidural.              Consents    Anesthesia Plan(s) and associated risks, benefits, and realistic alternatives discussed. Questions answered and patient/representative(s) expressed understanding.     - Discussed: Risks, Benefits and Alternatives for the PROCEDURE were discussed     - Discussed with:  Patient            Postoperative Care       PONV prophylaxis: Ondansetron (or other 5HT-3)     Comments:                Sam Escobar MD

## 2023-08-16 NOTE — PLAN OF CARE
Hypotension noted (see flow sheet). Annette urine noted in brooks. Will give  ml bolus per order.

## 2023-08-16 NOTE — PLAN OF CARE
Data: Patient admitted to room 412 at 2202. Patient is a . Prenatal record reviewed.   OB History    Para Term  AB Living   1 0 0 0 0 0   SAB IAB Ectopic Multiple Live Births   0 0 0 0 0      # Outcome Date GA Lbr Raf/2nd Weight Sex Delivery Anes PTL Lv   1 Current            .  Medical History: History reviewed. No pertinent past medical history..  Gestational age 41w0d. Vital signs per doc flowsheet. Fetal movement present. Patient reports No chief complaint on file.   as reason for admission. Support persons  Domo present.  Action: Report from LINETTE Cabral obtained at 2300. Care of patient assumed at 2300. Verbal consent for EFM, external fetal monitors applied. Admission assessment completed. Patient and support persons educated on labor process. Patient instructed to report change in fetal movement, contractions, vaginal leaking of fluid or bleeding, abdominal pain, or any concerns related to the pregnancy to her nurse/physician. Patient oriented to room, call light in reach.   Response: Dr. Garcia informed of status. Plan per provider is admit and observe labor progress. Patient verbalized understanding of education and verbalized agreement with plan. Patient coping with labor via breathing and position changes.

## 2023-08-17 LAB — HGB BLD-MCNC: 11.9 G/DL (ref 11.7–15.7)

## 2023-08-17 PROCEDURE — 722N000001 HC LABOR CARE VAGINAL DELIVERY SINGLE

## 2023-08-17 PROCEDURE — 36415 COLL VENOUS BLD VENIPUNCTURE: CPT | Performed by: OBSTETRICS & GYNECOLOGY

## 2023-08-17 PROCEDURE — 85018 HEMOGLOBIN: CPT | Performed by: OBSTETRICS & GYNECOLOGY

## 2023-08-17 PROCEDURE — 120N000001 HC R&B MED SURG/OB

## 2023-08-17 PROCEDURE — 0UQMXZZ REPAIR VULVA, EXTERNAL APPROACH: ICD-10-PCS | Performed by: OBSTETRICS & GYNECOLOGY

## 2023-08-17 PROCEDURE — 999N000080 HC STATISTIC IP LACTATION SERVICES 16-30 MIN

## 2023-08-17 PROCEDURE — 10907ZC DRAINAGE OF AMNIOTIC FLUID, THERAPEUTIC FROM PRODUCTS OF CONCEPTION, VIA NATURAL OR ARTIFICIAL OPENING: ICD-10-PCS | Performed by: OBSTETRICS & GYNECOLOGY

## 2023-08-17 PROCEDURE — 250N000011 HC RX IP 250 OP 636: Mod: JZ | Performed by: OBSTETRICS & GYNECOLOGY

## 2023-08-17 PROCEDURE — 250N000013 HC RX MED GY IP 250 OP 250 PS 637: Performed by: OBSTETRICS & GYNECOLOGY

## 2023-08-17 PROCEDURE — 0UQGXZZ REPAIR VAGINA, EXTERNAL APPROACH: ICD-10-PCS | Performed by: OBSTETRICS & GYNECOLOGY

## 2023-08-17 RX ORDER — BISACODYL 10 MG
10 SUPPOSITORY, RECTAL RECTAL DAILY PRN
Status: DISCONTINUED | OUTPATIENT
Start: 2023-08-17 | End: 2023-08-18 | Stop reason: HOSPADM

## 2023-08-17 RX ORDER — DOCUSATE SODIUM 100 MG/1
100 CAPSULE, LIQUID FILLED ORAL DAILY
Status: DISCONTINUED | OUTPATIENT
Start: 2023-08-17 | End: 2023-08-18 | Stop reason: HOSPADM

## 2023-08-17 RX ORDER — MISOPROSTOL 200 UG/1
400 TABLET ORAL
Status: DISCONTINUED | OUTPATIENT
Start: 2023-08-17 | End: 2023-08-18 | Stop reason: HOSPADM

## 2023-08-17 RX ORDER — METHYLERGONOVINE MALEATE 0.2 MG/ML
200 INJECTION INTRAVENOUS
Status: DISCONTINUED | OUTPATIENT
Start: 2023-08-17 | End: 2023-08-18 | Stop reason: HOSPADM

## 2023-08-17 RX ORDER — ACETAMINOPHEN 325 MG/1
650 TABLET ORAL EVERY 4 HOURS PRN
Status: DISCONTINUED | OUTPATIENT
Start: 2023-08-17 | End: 2023-08-18 | Stop reason: HOSPADM

## 2023-08-17 RX ORDER — OXYTOCIN 10 [USP'U]/ML
10 INJECTION, SOLUTION INTRAMUSCULAR; INTRAVENOUS
Status: DISCONTINUED | OUTPATIENT
Start: 2023-08-17 | End: 2023-08-18 | Stop reason: HOSPADM

## 2023-08-17 RX ORDER — TRANEXAMIC ACID 10 MG/ML
1 INJECTION, SOLUTION INTRAVENOUS EVERY 30 MIN PRN
Status: DISCONTINUED | OUTPATIENT
Start: 2023-08-17 | End: 2023-08-18 | Stop reason: HOSPADM

## 2023-08-17 RX ORDER — MODIFIED LANOLIN
OINTMENT (GRAM) TOPICAL
Status: DISCONTINUED | OUTPATIENT
Start: 2023-08-17 | End: 2023-08-18 | Stop reason: HOSPADM

## 2023-08-17 RX ORDER — MISOPROSTOL 200 UG/1
800 TABLET ORAL
Status: DISCONTINUED | OUTPATIENT
Start: 2023-08-17 | End: 2023-08-18 | Stop reason: HOSPADM

## 2023-08-17 RX ORDER — IBUPROFEN 800 MG/1
800 TABLET, FILM COATED ORAL EVERY 6 HOURS PRN
Status: DISCONTINUED | OUTPATIENT
Start: 2023-08-17 | End: 2023-08-18 | Stop reason: HOSPADM

## 2023-08-17 RX ORDER — HYDROCORTISONE 25 MG/G
CREAM TOPICAL 3 TIMES DAILY PRN
Status: DISCONTINUED | OUTPATIENT
Start: 2023-08-17 | End: 2023-08-18 | Stop reason: HOSPADM

## 2023-08-17 RX ORDER — CARBOPROST TROMETHAMINE 250 UG/ML
250 INJECTION, SOLUTION INTRAMUSCULAR
Status: DISCONTINUED | OUTPATIENT
Start: 2023-08-17 | End: 2023-08-18 | Stop reason: HOSPADM

## 2023-08-17 RX ORDER — OXYTOCIN/0.9 % SODIUM CHLORIDE 30/500 ML
340 PLASTIC BAG, INJECTION (ML) INTRAVENOUS CONTINUOUS PRN
Status: DISCONTINUED | OUTPATIENT
Start: 2023-08-17 | End: 2023-08-18 | Stop reason: HOSPADM

## 2023-08-17 RX ADMIN — ACETAMINOPHEN 650 MG: 325 TABLET, FILM COATED ORAL at 08:24

## 2023-08-17 RX ADMIN — KETOROLAC TROMETHAMINE 30 MG: 30 INJECTION, SOLUTION INTRAMUSCULAR; INTRAVENOUS at 03:11

## 2023-08-17 RX ADMIN — IBUPROFEN 800 MG: 800 TABLET ORAL at 11:56

## 2023-08-17 RX ADMIN — DOCUSATE SODIUM 100 MG: 100 CAPSULE, LIQUID FILLED ORAL at 10:47

## 2023-08-17 RX ADMIN — IBUPROFEN 800 MG: 800 TABLET ORAL at 18:56

## 2023-08-17 ASSESSMENT — ACTIVITIES OF DAILY LIVING (ADL)
ADLS_ACUITY_SCORE: 18
ADLS_ACUITY_SCORE: 19
ADLS_ACUITY_SCORE: 18

## 2023-08-17 NOTE — LACTATION NOTE
Lactation Visit:  Janina called for assistance with feeding. Infant was paced STS in the cradle hold. Infant was able to latch successfully with assistance. Janina has small amount of breast tissue and breasts are spread fairly far apart.Pt has not noticed a significant change in her breast tissue during pregnancy or since birth. Pt nipples and areola appear to be typical in size and infant is able to latch. Mother does have colostrum with minimal hand expression and swallows are heard when infant is sucking. Janina states that infant has been slipping off of the nipple during the previous feedings and her nipples are tender. She is using mother's love cream at this time. Discussed the importance of having infant open mouth wide and having flanged lips over not just the nipple but the areola as well. Infant was able to nurse on both sides and currently is meeting the expected goals for age. Encouraged mother to call for assistance with feedings. Lactation to follow-up if able.

## 2023-08-17 NOTE — PROVIDER NOTIFICATION
08/17/23 0014   Provider Notification   Provider Name/Title Dr. Candelaria   Method of Notification Phone   Request Evaluate - Remote   Notification Reason SVE;Status Update     SVE 10/100/0. Pt feeling increased pressure and pain in lower back. Oral temp 99.2. FHT's 155 bpm, baseline has been 140-145. Will start pushing. MD states she is 20 minutes away and to call when needed for delivery.

## 2023-08-17 NOTE — PLAN OF CARE
Vitals remained stable. Postpartum checks within normal limits. Ambulating independently. Voiding spontaneously. Pain controlled with tylenol and ibuprofen. Breastfeeding every 2-3 hours, educated on ways to stimulate baby to wake for feedings and to sustain a latch while feeding. Bonding well with baby.

## 2023-08-17 NOTE — PROVIDER NOTIFICATION
Dr Geiger at bedside per RN request.  Patient is  and having a strong urge to push.  Dr moore is enroute.

## 2023-08-17 NOTE — PLAN OF CARE
Goal Outcome Evaluation:      Plan of Care Reviewed With: patient    Overall Patient Progress: improvingOverall Patient Progress: improving    Pt VSS, is bonding well with infant. Tolerating regular diet and able to ambulate independently, urine output adequate.  Pt utilizing tylenol and ibuprofen for pain management. Pt education done, see flow sheet. Breastfeeding, needs some assistance but improving throughout day.

## 2023-08-17 NOTE — PROGRESS NOTES
Post-partum Note      S: Patient is doing well today.  Pain is controlled with PO medications.  Tolerating regular diet without nausea or vomiting.  Ambulating without dizziness.  Urinating without difficulty. Lochia normal.  Breastfeeding.    O:   Patient Vitals for the past 24 hrs:   BP Temp Temp src Pulse Resp SpO2   08/17/23 0815 127/78 97.9  F (36.6  C) Oral 81 -- --   08/17/23 0345 121/75 -- -- -- -- --   08/17/23 0318 122/63 -- -- -- -- --   08/17/23 0300 109/79 -- -- -- -- --   08/17/23 0245 110/70 -- -- -- -- --   08/17/23 0230 (!) 140/60 -- -- -- -- --   08/17/23 0215 123/68 -- -- -- -- --   08/17/23 0208 -- 98.5  F (36.9  C) Oral -- -- --   08/17/23 0203 (!) 130/91 -- -- -- -- --   08/17/23 0115 109/57 98.6  F (37  C) Oral -- -- --   08/17/23 0014 121/76 -- -- -- -- --   08/16/23 2350 -- 99.2  F (37.3  C) Oral -- -- --   08/16/23 2155 -- 98.5  F (36.9  C) Oral -- -- --   08/16/23 2145 -- 98.5  F (36.9  C) Oral -- -- --   08/16/23 2000 122/68 -- -- -- -- --   08/16/23 1950 -- 98.7  F (37.1  C) Oral -- -- --   08/16/23 1930 135/87 -- -- -- -- --   08/16/23 1858 -- 99.4  F (37.4  C) Oral -- -- --   08/16/23 1835 124/77 -- -- -- -- --   08/16/23 1806 119/75 -- -- -- -- --   08/16/23 1742 -- 98.7  F (37.1  C) Oral -- -- --   08/16/23 1735 121/75 -- -- -- -- --   08/16/23 1705 111/79 -- -- -- -- --   08/16/23 1651 -- 98.6  F (37  C) -- -- -- --   08/16/23 1645 -- 98.6  F (37  C) Oral -- -- --   08/16/23 1635 108/61 -- -- -- -- --   08/16/23 1605 101/59 -- -- -- -- --   08/16/23 1600 101/59 -- -- -- -- --   08/16/23 1535 117/71 -- -- -- -- --   08/16/23 1533 -- -- -- -- -- 99 %   08/16/23 1510 107/70 -- -- -- -- 99 %   08/16/23 1500 -- 98.7  F (37.1  C) Oral -- -- --   08/16/23 1457 -- 98.9  F (37.2  C) Oral -- -- --   08/16/23 1435 92/55 -- -- -- -- --   08/16/23 1433 -- -- -- -- -- 99 %   08/16/23 1347 -- 98.3  F (36.8  C) Oral -- -- --   08/16/23 1335 111/69 -- -- -- -- --   08/16/23 1333 -- -- -- -- -- 97 %    23 1306 117/69 -- -- -- -- --   23 1303 -- -- -- -- -- 97 %   23 1234 -- 98.5  F (36.9  C) Oral -- -- --   23 1210 101/58 -- -- -- -- 99 %   23 1138 -- -- -- -- -- 100 %   23 1135 98/59 -- -- -- 18 --   23 1133 -- -- -- -- -- 99 %   23 1040 -- 98.1  F (36.7  C) Oral -- -- --   23 1035 96/57 -- -- -- -- --   23 1033 -- -- -- -- -- 100 %   23 1005 103/55 -- -- -- -- --   23 1003 -- -- -- -- -- 96 %   23 0953 -- 98.5  F (36.9  C) Oral -- -- --   23 0935 118/56 -- -- -- -- --   23 0933 -- -- -- -- -- 99 %   23 0843 -- -- -- -- -- 97 %   23 0840 -- 98.3  F (36.8  C) Oral -- 18 --   23 0835 98/56 -- -- -- -- --     Gen:  Resting comfortably, NAD  Pulm:  Breathing comfortably on room air  Abd:  Soft, appropriately ttp, non-distended.Fundus below umbilicus, firm and non-tender.  Ext:  non-tender, trace bilateral LE edema    I/O last 3 completed shifts:  In:  [I.V.:]  Out: 1105 [Urine:955; Blood:150]    Hgb:   Hemoglobin   Date Value Ref Range Status   2023 11.9 11.7 - 15.7 g/dL Final       Assessment/Plan:  32 year old  on PPD #0 s/p , spontaneous labor at 41wks.  1. Continue with routine postpartum management  2. Analgesia adequate  3. Rh: Positive  4. Feed: Breastfeeding  Dispo: DC home PPD#1-2. Warning signs reviewed. Follow-up in 6 weeks.    Maria Fernanda Loyola MD  [facility]  2023, 8:30 AM

## 2023-08-17 NOTE — L&D DELIVERY NOTE
OB Vaginal Delivery Note  [facility]      HPI:  Pt is a 32 year old  @ 41w1d who presented to L&D on 8/15/2023 for spontaneous labor         Prenatal labs:  O POS antibody screen: negative, Rubella Immune,  Hep B/HIV/RPR all negative, GC/CT negative, GCT passed, GBS passed. S/p tdap.     Pregnancy complications:    1.Obesity  2.history of gastric sleeve. She had a normal growth ultrasound in the 3rd trimester.     Hospital Course:    First Stage: On admission, contractions were every 3-7 minutes and patient was 5-6 cm dilated. FHTs were in the 120s with accelerations present. moderate variability,  no decelerations noted.   Abdomen was non-tender.  EFW was 8.5 pounds by Leopold's.  Patient's labor was augmented with AROM/pitocin.    At the patient's request, she received epidural analgesia.  She did receive pitocin for augmentation of labor, to a maximum of 10mu/min.  Artificial ROM occurred at 0820 with clear fluid noted.  Patient reached complete cervical dilation at 2350 on 2023.    Second Stage/Third Stage:  Patient did labor down , and began pushing at 0040.  Good maternal expulsive efforts were noted.  Fetal heart tones remained reassuring during the second stage.  Baseline 150, with moderate variability, variable decelerations noted.      In house was called for precipitous delivery. Please see her note for delivery of infant and placenta. Placenta delivered at 0203    Called by primary RN at 0142 that I should head in for delivery   Was notified at 0156 that in house in for delivery  Notified at 0200 that patient delivered   Arrival at bedside at 0204 right after placenta was delivered.      A female infant was then delivered without complications at 0200 on 2023.  The infant's weight was 7 pounds 7.6 ounces.  Apgars were 8 and 9 at one and five minutes .       I took over for the in house and completed the laceration evaluation and repair     Placenta was inspected  and was intact.     The patient's perineum was inspected and left vaginal and left labial laceration noted.  The area was repaired in the usual fashion using 4-0 and 3-0 vicryl suture.  EBL for the procedure was 150 ml.    Sponge and needle counts were correct.  The patient and infant remained in the delivery suite following delivery in stable condition.    MD Jeanine Martinez OB/GYN  8/17/2023  2:38 AM

## 2023-08-17 NOTE — PROCEDURES
Delivery Note  Diagnosis: Intrauterine pregnancy at 41w1d  Procedure:   Findings: Liveborn female infant,  Anesthesia: Epidural    QBL:  50 cc    Janina Mcarthur is a 32 year old  female at 41w1d weeks gestation. As in house call MD I was called to the room for percipituous delivery. Upon arrival the head was crowing. She breathed through two contractions. However, was asking to push and deliver. Over one contraction she delivered a female over a 1st degree vaginal laceration.     Cord clamping was delayed by 1 minute, at which time the cord was doubly clamped and cut.     The third stage was actively managed with external uterine massage, gentle cord traction and pitocin. The placenta delivered spontaneously and intact. 1st degree left vaginal laceration was noted upon arrival of her primary MD. This was repaired with 3-0 vicryl. Excellent hemostasis was noted. All counts were correct before and after the delivery.     Jacqueline Geiger MD

## 2023-08-17 NOTE — ANESTHESIA POSTPROCEDURE EVALUATION
Patient: Janina Mcarthur    Procedure: * No procedures listed *       Anesthesia Type:  Epidural    Note:  Disposition: Inpatient   Postop Pain Control: Uneventful            Sign Out: Well controlled pain   PONV: No   Neuro/Psych: Uneventful            Sign Out: Acceptable/Baseline neuro status   Airway/Respiratory: Uneventful            Sign Out: Acceptable/Baseline resp. status   CV/Hemodynamics: Uneventful            Sign Out: Acceptable CV status; No obvious hypovolemia; No obvious fluid overload   Other NRE:    DID A NON-ROUTINE EVENT OCCUR?     Event details/Postop Comments:  S/P epidural for labor. I or my partner remained immediately available, monitored the patient, and supervised nursing staff at key events and necessary intervals.    The patient is doing well. VSS Temp normal. Satisfactory cardiovascular and repiratory function. Neuro at baseline. Denies positional headache. Minimal side effects easily managed w/ PRN meds. No apparent anesthetic complications. No follow-up required.    [email or web address]         Last vitals:  Vitals:    08/17/23 0300 08/17/23 0318 08/17/23 0345   BP: 109/79 122/63 121/75   Pulse:      Resp:      Temp:      SpO2:          Electronically Signed By: Sam Escobar MD  August 17, 2023  6:44 AM

## 2023-08-17 NOTE — PROVIDER NOTIFICATION
08/16/23 9665   Provider Notification   Provider Name/Title Dr. Candelaria   Method of Notification Phone   Request Evaluate - Remote   Notification Reason SVE;Uterine Activity;Decels     SVE 9.5/100/0. Contractions 1.5-3 minutes. FHT's moderate variability with intermittent lates and ocassional accel, repositioning pt. Pitocin remains @ 10 carlyn-units. Brooks removed due to brooks bulb back to being in urethra and given the bloody urine, will S/C intermittently. Will continue with current POC.

## 2023-08-17 NOTE — PROVIDER NOTIFICATION
08/16/23 2003   Provider Notification   Provider Name/Title Dr. Candelaria   Method of Notification Phone   Request Evaluate - Remote   Notification Reason SVE;Status Update     SVE unchanged since 1845 9/90/-1. Brooks bulb not felt with exam so brooks remains in place although urine output continues to be low and bloody. FHT's moderate variability with occasional variables. Contractions 2-5 minutes, moderate. Pit on 10 carlyn-units. MD gave TORB to continue increasing pitocin when clinically indicated and recheck cervix by 2230.

## 2023-08-17 NOTE — PLAN OF CARE
Data: Janina Mcarthur transferred to postpartum via wheelchair at 0450. Baby transferred via parent's arms.  Action: Receiving unit notified of transfer: Yes. Patient and family notified of room change. Report given to LINETTE Dennis at 0505. Belongings sent to receiving unit. Accompanied by Registered Nurse. Oriented patient to surroundings. Call light within reach. ID bands double-checked with receiving RN.  Response: Patient tolerated transfer and is stable.  Patients mobililty level scored using the bedside mobility assistance tool (BMAT). Patient is at a mobility level test number: 4. Mobility equipment used: wheelchair. Required assist of 0 staff members. Further use of BMAT scoring not required.

## 2023-08-17 NOTE — PLAN OF CARE
Care assumed at 5am. Room orientation completed: use of call light, basic infant care, use of bulb syringe, safe sleep techniques, fall prevention, plan of care. 4 part ID Bracelets checked and verified. Given tucks/ice for laceration. Due to void by 8, patient aware. No nausea. Appropriate bonding/cares for baby. Breast feeding. Education completed with all cares.

## 2023-08-18 VITALS
SYSTOLIC BLOOD PRESSURE: 112 MMHG | TEMPERATURE: 98.3 F | BODY MASS INDEX: 37.14 KG/M2 | DIASTOLIC BLOOD PRESSURE: 75 MMHG | OXYGEN SATURATION: 99 % | RESPIRATION RATE: 16 BRPM | HEART RATE: 78 BPM | WEIGHT: 222.9 LBS | HEIGHT: 65 IN

## 2023-08-18 PROBLEM — Z36.89 ENCOUNTER FOR TRIAGE IN PREGNANT PATIENT: Status: RESOLVED | Noted: 2023-08-15 | Resolved: 2023-08-18

## 2023-08-18 PROCEDURE — 250N000013 HC RX MED GY IP 250 OP 250 PS 637: Performed by: OBSTETRICS & GYNECOLOGY

## 2023-08-18 RX ORDER — DOCUSATE SODIUM 100 MG/1
100 CAPSULE, LIQUID FILLED ORAL DAILY
COMMUNITY
Start: 2023-08-18

## 2023-08-18 RX ORDER — ACETAMINOPHEN 325 MG/1
650 TABLET ORAL EVERY 4 HOURS PRN
COMMUNITY
Start: 2023-08-18

## 2023-08-18 RX ORDER — IBUPROFEN 800 MG/1
800 TABLET, FILM COATED ORAL EVERY 6 HOURS PRN
COMMUNITY
Start: 2023-08-18

## 2023-08-18 RX ADMIN — DOCUSATE SODIUM 100 MG: 100 CAPSULE, LIQUID FILLED ORAL at 10:34

## 2023-08-18 RX ADMIN — BENZOCAINE: 11.4 AEROSOL, SPRAY TOPICAL at 10:58

## 2023-08-18 RX ADMIN — IBUPROFEN 800 MG: 800 TABLET ORAL at 01:50

## 2023-08-18 ASSESSMENT — ACTIVITIES OF DAILY LIVING (ADL)
ADLS_ACUITY_SCORE: 18
ADLS_ACUITY_SCORE: 18
DEPENDENT_IADLS:: INDEPENDENT
ADLS_ACUITY_SCORE: 18

## 2023-08-18 NOTE — PLAN OF CARE
VSS on room air. Fundus/lochia WDL. Voiding appropriately and ambulating independently. Pain adequately controlled. Breastfeeding infant independently q2-3hr. S/o at bedside, supportive. Positive bonding and attachment with infant observed.      Birth Certificate and PP depression screen received. Education completed and AVS/discharge paperwork reviewed.  Patient indicates understanding discharge instructions. Questions answered, concerns addressed, resources provided. Verbalizes when to return to clinic for follow up for herself and infant.  Staff escorted patient and infant off unit with AVS/discharge paperwork  and personal belongings.

## 2023-08-18 NOTE — PROGRESS NOTES
Post-partum Note      S: Patient is doing well today.  Pain is controlled with PO medications.  Tolerating regular diet without nausea or vomiting.  Ambulating without dizziness.  Urinating without difficulty. Lochia normal.  Breastfeeding, going okay     O:  Patient Vitals for the past 24 hrs:   BP Temp Temp src Pulse Resp   23 0134 102/68 97.9  F (36.6  C) Oral 78 18   23 2042 120/77 98.2  F (36.8  C) Oral 92 18   23 1625 121/80 98.1  F (36.7  C) Oral 93 16   23 1145 114/72 98.3  F (36.8  C) Oral 85 --       Gen:  Resting comfortably, NAD  Pulm:  Breathing comfortably on room air  Abd:  Soft, appropriately ttp, non-distended.Fundus at umbilicus, firm and non-tender.  Ext:  non-tender, 1+ bilateral LE edema    No intake/output data recorded.    Hgb:     Hemoglobin   Date Value Ref Range Status   2023 11.9 11.7 - 15.7 g/dL Final   08/15/2023 14.3 11.7 - 15.7 g/dL Final       Assessment/Plan:  32 year old  on PPD #1 s/p  after spontaneous labor at 41w1d.  1. Continue with routine postpartum management  2. Pain well controlled   3. EBL: 150ml ; pre hemoglobin 14.3, post hemogobin 11.9, no symptoms of anemia.   4. O+ , Rubella immune  5. Feed: Breastfeeding  6. CV/RESP: vss  7. DVT PPX: ambulation     Dispo: Anticipate DC home today. Warning signs reviewed. Follow-up in 6 weeks.    MD Jeanine Gómez OB/GYN  2023, 9:41 AM

## 2023-08-18 NOTE — LACTATION NOTE
This note was copied from a baby's chart.   visit. Will is Janina's first baby, she reports infant was cluster feeding overnight. She feels comfortable with latching on R breast, L side has been more difficult to get a deep latch. Writer assisted with positioning tummy to tummy, hand expression done to entice latch and large drops seen. Will eager and latched with wide mouth and flanged lips - initially painful for Janina, lower lip pulled out and sensation improved. With tactile stimulation and breast compressions swallows heard and pointed out to Janina. Discharge education reviewed, outpatient lactation resources discussed. She has a breast pump for home use, reviewed resources. Encouraged her to call out with any lactation needs prior to discharge. Bedside RN updated on visit.

## 2023-08-18 NOTE — PLAN OF CARE
VSS. Up ad graciela. Voiding without difficulty. Lochia scant, no clots. Fundus firm and midline. Pain well managed with Ibuprofen. Breastfeeding, tolerating well. FOB supportive and at bedside. Bonding well with infant.

## 2023-08-18 NOTE — DISCHARGE INSTRUCTIONS
Contact your doctor if you have a temperature >100.4F, if you experience heavy bleeding and are soaking through a pad in less than one hour, if you are not able to eat or drink, or have severe abdominal pain.     Nothing in the vagina for 6 weeks post partum. No intercourse, tampons or douching.   Showers are okay. No soaking in a bath, sitz baths for 15-20 min 2x daily are okay.     Please make a follow up appointment in the office in 6 weeks.     Please call the office with chest pain with shortness of breath or blurry vision with a headache, or upper abdominal pain as this could be concerning for preeclampsia (blood pressure problems with pregnancy and post partum).     Postpartum Vaginal Delivery Instructions    Activity     Ask family and friends for help when you need it.  Do not place anything in your vagina for 6 weeks.  You are not restricted on other activities, but take it easy for a few weeks to allow your body to recover from delivery.  You are able to do any activities you feel up to that point.  No driving until you have stopped taking your pain medications (usually two weeks after delivery).     Call your health care provider if you have any of these symptoms:     Increased pain, swelling, redness, or fluid around your stiches from an episiotomy or perineal tear.  A fever above 100.4 F (38 C) with or without chills when placing a thermometer under your tongue.  You soak a sanitary pad with blood within 1 hour, or you see blood clots larger than a golf ball.  Bleeding that lasts more than 6 weeks.  Vaginal discharge that smells bad.  Severe pain, cramping or tenderness in your lower belly area.  A need to urinate more frequently (use the toilet more often), more urgently (use the toilet very quickly), or it burns when you urinate.  Nausea and vomiting.  Redness, swelling or pain around a vein in your leg.  Problems breastfeeding or a red or painful area on your breast.  Chest pain and cough or are  gasping for air.  Problems coping with sadness, anxiety, or depression.  If you have any concerns about hurting yourself or the baby, call your provider immediately.   You have questions or concerns after you return home.     Keep your hands clean:  Always wash your hands before touching your perineal area and stitches.  This helps reduce your risk of infection.  If your hands aren't dirty, you may use an alcohol hand-rub to clean your hands. Keep your nails clean and short.

## 2024-07-27 ENCOUNTER — HEALTH MAINTENANCE LETTER (OUTPATIENT)
Age: 34
End: 2024-07-27

## 2024-08-01 ENCOUNTER — LAB REQUISITION (OUTPATIENT)
Dept: LAB | Facility: CLINIC | Age: 34
End: 2024-08-01

## 2024-08-01 DIAGNOSIS — Z13.220 ENCOUNTER FOR SCREENING FOR LIPOID DISORDERS: ICD-10-CM

## 2024-08-01 DIAGNOSIS — Z13.1 ENCOUNTER FOR SCREENING FOR DIABETES MELLITUS: ICD-10-CM

## 2024-08-01 DIAGNOSIS — N93.9 ABNORMAL UTERINE AND VAGINAL BLEEDING, UNSPECIFIED: ICD-10-CM

## 2024-08-01 LAB
HBA1C MFR BLD: 5.3 %
HOLD SPECIMEN: NORMAL

## 2024-08-01 PROCEDURE — 83036 HEMOGLOBIN GLYCOSYLATED A1C: CPT | Performed by: NURSE PRACTITIONER

## 2024-08-01 PROCEDURE — 80061 LIPID PANEL: CPT | Performed by: NURSE PRACTITIONER

## 2024-08-01 PROCEDURE — 84443 ASSAY THYROID STIM HORMONE: CPT | Performed by: NURSE PRACTITIONER

## 2024-08-02 LAB
CHOLEST SERPL-MCNC: 158 MG/DL
FASTING STATUS PATIENT QL REPORTED: NO
HDLC SERPL-MCNC: 56 MG/DL
LDLC SERPL CALC-MCNC: 68 MG/DL
NONHDLC SERPL-MCNC: 102 MG/DL
TRIGL SERPL-MCNC: 169 MG/DL
TSH SERPL DL<=0.005 MIU/L-ACNC: 1.06 UIU/ML (ref 0.3–4.2)

## 2025-08-10 ENCOUNTER — HEALTH MAINTENANCE LETTER (OUTPATIENT)
Age: 35
End: 2025-08-10

## 2025-08-13 ENCOUNTER — LAB REQUISITION (OUTPATIENT)
Dept: LAB | Facility: CLINIC | Age: 35
End: 2025-08-13

## 2025-08-13 DIAGNOSIS — N92.6 IRREGULAR MENSTRUATION, UNSPECIFIED: ICD-10-CM

## 2025-08-13 DIAGNOSIS — E28.2 POLYCYSTIC OVARIAN SYNDROME: ICD-10-CM

## 2025-08-13 LAB
EST. AVERAGE GLUCOSE BLD GHB EST-MCNC: 103 MG/DL
HBA1C MFR BLD: 5.2 %
TSH SERPL DL<=0.005 MIU/L-ACNC: 0.92 UIU/ML (ref 0.3–4.2)

## 2025-08-13 PROCEDURE — 83036 HEMOGLOBIN GLYCOSYLATED A1C: CPT | Performed by: NURSE PRACTITIONER

## 2025-08-13 PROCEDURE — 84443 ASSAY THYROID STIM HORMONE: CPT | Performed by: NURSE PRACTITIONER
